# Patient Record
Sex: FEMALE | URBAN - METROPOLITAN AREA
[De-identification: names, ages, dates, MRNs, and addresses within clinical notes are randomized per-mention and may not be internally consistent; named-entity substitution may affect disease eponyms.]

---

## 2023-03-28 ENCOUNTER — OFFICE VISIT (OUTPATIENT)
Dept: URGENT CARE | Facility: CLINIC | Age: 53
End: 2023-03-28

## 2023-03-28 VITALS
OXYGEN SATURATION: 98 % | RESPIRATION RATE: 18 BRPM | TEMPERATURE: 99.4 F | HEIGHT: 66 IN | DIASTOLIC BLOOD PRESSURE: 80 MMHG | HEART RATE: 88 BPM | SYSTOLIC BLOOD PRESSURE: 122 MMHG | WEIGHT: 156.6 LBS | BODY MASS INDEX: 25.17 KG/M2

## 2023-03-28 DIAGNOSIS — J01.40 ACUTE PANSINUSITIS, RECURRENCE NOT SPECIFIED: Primary | ICD-10-CM

## 2023-03-28 RX ORDER — AMOXICILLIN AND CLAVULANATE POTASSIUM 875; 125 MG/1; MG/1
1 TABLET, FILM COATED ORAL EVERY 12 HOURS SCHEDULED
Qty: 14 TABLET | Refills: 0 | Status: SHIPPED | OUTPATIENT
Start: 2023-03-28 | End: 2023-04-04

## 2023-03-28 NOTE — PATIENT INSTRUCTIONS
Acute Sinusitis  - Augmentin prescribed, complete as directed   - recommended to start Flonase nasal spray   - take Tylenol or Motrin as needed   - drink plenty of fluids and run a humidifier at home   - continue Mucinex as needed for cough   - if symptoms persist despite treatment or worsen, follow up w/ PCP for re-check

## 2023-03-28 NOTE — PROGRESS NOTES
[facility]        NAME: Kiera Lopez is a 47 y o  female  : 1970    MRN: [id number]  DATE: 2023  TIME: 1:15 PM    Assessment and Plan   Acute pansinusitis, recurrence not specified [J01 40]  1  Acute pansinusitis, recurrence not specified  amoxicillin-clavulanate (AUGMENTIN) 875-125 mg per tablet            Patient Instructions     Patient Instructions   1  Acute Sinusitis  - Augmentin prescribed, complete as directed   - recommended to start Flonase nasal spray   - take Tylenol or Motrin as needed   - drink plenty of fluids and run a humidifier at home   - continue Mucinex as needed for cough   - if symptoms persist despite treatment or worsen, follow up w/ PCP for re-check       Follow up with PCP in 3-5 days  Proceed to  ER if symptoms worsen  Chief Complaint     Chief Complaint   Patient presents with   • Cold Like Symptoms     Pt here ill x 3 weeks pt states cough and runny nose,  home covid  test  neg  2 weeks ago  Pt is vaxed, current flu  shot  Pt used  Mucinex and OTC cough med  History of Present Illness       46 yo female presents c/o sinus pressure, nasal congestion, rhinorrhea, post-nasal drip, chest congestion, and productive cough  She has been ill x 3 weeks  No fever/chills  No headache or body aches  No chest pain, SOB, or wheezing  Patient is a smoker  No loss of taste or smell  No recent travel or known exposure to anyone with COVID-19  Patient has received the COVID-19 vaccine  She tested for COVID-19 at home and was negative  She has no known allergies  She has taken Mucinex for the symptoms  Review of Systems   Review of Systems   Constitutional: Negative  HENT:        As noted in HPI   Eyes: Negative  Respiratory:        As noted in HPI   Cardiovascular: Negative  Gastrointestinal: Negative  Musculoskeletal: Negative  Skin: Negative  Allergic/Immunologic: Negative  Neurological: Negative  Hematological: Negative  "        Current Medications       Current Outpatient Medications:   •  amoxicillin-clavulanate (AUGMENTIN) 875-125 mg per tablet, Take 1 tablet by mouth every 12 (twelve) hours for 7 days, Disp: 14 tablet, Rfl: 0    Current Allergies     Allergies as of 03/28/2023   • (No Known Allergies)            The following portions of the patient's history were reviewed and updated as appropriate: allergies, current medications, past family history, past medical history, past social history, past surgical history and problem list      Past Medical History:   Diagnosis Date   • Patient denies medical problems        History reviewed  No pertinent surgical history  Family History   Problem Relation Age of Onset   • Hypertension Mother          Medications have been verified  Objective   /80   Pulse 88   Temp 99 4 °F (37 4 °C) (Tympanic)   Resp 18   Ht 5' 6\" (1 676 m)   Wt 71 kg (156 lb 9 6 oz)   SpO2 98%   BMI 25 28 kg/m²   No LMP recorded  Physical Exam     Physical Exam  Vitals and nursing note reviewed  Constitutional:       General: She is awake  She is not in acute distress  Appearance: Normal appearance  She is well-developed and well-groomed  She is not ill-appearing, toxic-appearing or diaphoretic  HENT:      Head: Normocephalic and atraumatic  Jaw: There is normal jaw occlusion  Right Ear: Tympanic membrane, ear canal and external ear normal       Left Ear: Tympanic membrane, ear canal and external ear normal       Nose: Mucosal edema and congestion present  Mouth/Throat:      Lips: Pink  No lesions  Mouth: Mucous membranes are moist       Pharynx: Oropharynx is clear  Uvula midline  Neck:      Trachea: Trachea and phonation normal    Cardiovascular:      Rate and Rhythm: Normal rate and regular rhythm  Pulses: Normal pulses  Heart sounds: Normal heart sounds     Pulmonary:      Effort: Pulmonary effort is normal  No tachypnea, accessory muscle usage " or respiratory distress  Breath sounds: Normal breath sounds and air entry  Musculoskeletal:      Cervical back: Neck supple  No edema, erythema, rigidity or tenderness  Lymphadenopathy:      Cervical: No cervical adenopathy  Skin:     General: Skin is warm and dry  Capillary Refill: Capillary refill takes less than 2 seconds  Coloration: Skin is not pale  Neurological:      Mental Status: She is alert and oriented to person, place, and time  Mental status is at baseline  Psychiatric:         Mood and Affect: Mood normal          Behavior: Behavior normal  Behavior is cooperative  Thought Content:  Thought content normal          Judgment: Judgment normal

## 2023-12-26 ENCOUNTER — OFFICE VISIT (OUTPATIENT)
Dept: URGENT CARE | Facility: CLINIC | Age: 53
End: 2023-12-26
Payer: COMMERCIAL

## 2023-12-26 VITALS
RESPIRATION RATE: 18 BRPM | BODY MASS INDEX: 24.27 KG/M2 | HEART RATE: 77 BPM | OXYGEN SATURATION: 100 % | HEIGHT: 66 IN | WEIGHT: 151 LBS | SYSTOLIC BLOOD PRESSURE: 120 MMHG | DIASTOLIC BLOOD PRESSURE: 78 MMHG | TEMPERATURE: 97.5 F

## 2023-12-26 DIAGNOSIS — R05.1 ACUTE COUGH: Primary | ICD-10-CM

## 2023-12-26 PROCEDURE — 87636 SARSCOV2 & INF A&B AMP PRB: CPT | Performed by: PREVENTIVE MEDICINE

## 2023-12-26 PROCEDURE — 99213 OFFICE O/P EST LOW 20 MIN: CPT | Performed by: PREVENTIVE MEDICINE

## 2023-12-26 RX ORDER — AZITHROMYCIN 250 MG/1
TABLET, FILM COATED ORAL
Qty: 6 TABLET | Refills: 0 | Status: SHIPPED | OUTPATIENT
Start: 2023-12-26 | End: 2023-12-30

## 2023-12-26 RX ORDER — CODEINE PHOSPHATE/GUAIFENESIN 10-100MG/5
5 LIQUID (ML) ORAL 3 TIMES DAILY PRN
Qty: 118 ML | Refills: 1 | Status: SHIPPED | OUTPATIENT
Start: 2023-12-26

## 2023-12-26 NOTE — PROGRESS NOTES
"[facility]        NAME: Nan Baez is a 53 y.o. female  : 1970    MRN: [id number]  DATE: 2023  TIME: 2:48 PM    Assessment and Plan   Acute cough [R05.1]  1. Acute cough  azithromycin (ZITHROMAX) 250 mg tablet    guaifenesin-codeine (GUAIFENESIN AC) 100-10 MG/5ML liquid            Patient Instructions       Follow up with PCP in 3-5 days.  Proceed to  ER if symptoms worsen.    Chief Complaint     Chief Complaint   Patient presents with    Cold Like Symptoms     Pt here ill x 1 week  pt s/s   stuffy head, runny eyes,  chest discomfort,  cough. Pt used  Sudafed.          History of Present Illness       Coughing and congestion and sinus pressure x 1-1/2 weeks.        Review of Systems   Review of Systems   HENT:  Positive for sinus pressure.    Respiratory:  Positive for cough.          Current Medications       Current Outpatient Medications:     azithromycin (ZITHROMAX) 250 mg tablet, Take 2 tablets today then 1 tablet daily x 4 days, Disp: 6 tablet, Rfl: 0    guaifenesin-codeine (GUAIFENESIN AC) 100-10 MG/5ML liquid, Take 5 mL by mouth 3 (three) times a day as needed for cough, Disp: 118 mL, Rfl: 1    Current Allergies     Allergies as of 2023    (No Known Allergies)            The following portions of the patient's history were reviewed and updated as appropriate: allergies, current medications, past family history, past medical history, past social history, past surgical history and problem list.     Past Medical History:   Diagnosis Date    Patient denies medical problems        History reviewed. No pertinent surgical history.    Family History   Problem Relation Age of Onset    Hypertension Mother          Medications have been verified.        Objective   /78   Pulse 77   Temp 97.5 °F (36.4 °C)   Resp 18   Ht 5' 6\" (1.676 m)   Wt 68.5 kg (151 lb)   SpO2 100%   BMI 24.37 kg/m²   No LMP recorded.       Physical Exam     Physical Exam  HENT:      Head:      " Comments: Sinus tenderness  Pulmonary:      Breath sounds: Normal breath sounds. No wheezing, rhonchi or rales.

## 2023-12-26 NOTE — PATIENT INSTRUCTIONS
Drink at least 6 or 8 glasses of water or juice a day.  Using a vaporizer or humidifier in the bedroom will be very helpful.  Motrin or Aleve or aspirin for fever chills or aches.  Robitussin DM or NyQuil for cough.  Afrin nasal spray for facial and head congestion. Inhaling steam coming up from the sink will be very helpful.  If symptoms are getting worse over the next 4-7 days you must be rechecked.

## 2023-12-27 LAB
FLUAV RNA RESP QL NAA+PROBE: NEGATIVE
FLUBV RNA RESP QL NAA+PROBE: NEGATIVE
SARS-COV-2 RNA RESP QL NAA+PROBE: POSITIVE

## 2023-12-29 ENCOUNTER — TELEPHONE (OUTPATIENT)
Dept: URGENT CARE | Facility: CLINIC | Age: 53
End: 2023-12-29